# Patient Record
Sex: FEMALE | Race: ASIAN | NOT HISPANIC OR LATINO | URBAN - METROPOLITAN AREA
[De-identification: names, ages, dates, MRNs, and addresses within clinical notes are randomized per-mention and may not be internally consistent; named-entity substitution may affect disease eponyms.]

---

## 2024-08-06 ENCOUNTER — INPATIENT (INPATIENT)
Facility: HOSPITAL | Age: 84
LOS: 2 days | Discharge: LEFT BEFORE DISCHARGE | DRG: 195 | End: 2024-08-09
Attending: INTERNAL MEDICINE | Admitting: INTERNAL MEDICINE
Payer: MEDICARE

## 2024-08-06 VITALS
WEIGHT: 110.01 LBS | DIASTOLIC BLOOD PRESSURE: 79 MMHG | HEIGHT: 60 IN | HEART RATE: 74 BPM | OXYGEN SATURATION: 94 % | TEMPERATURE: 98 F | RESPIRATION RATE: 16 BRPM | SYSTOLIC BLOOD PRESSURE: 134 MMHG

## 2024-08-06 DIAGNOSIS — J18.9 PNEUMONIA, UNSPECIFIED ORGANISM: ICD-10-CM

## 2024-08-06 DIAGNOSIS — Z98.890 OTHER SPECIFIED POSTPROCEDURAL STATES: Chronic | ICD-10-CM

## 2024-08-06 LAB
ALBUMIN SERPL ELPH-MCNC: 2.8 G/DL — LOW (ref 3.3–5)
ALP SERPL-CCNC: 141 U/L — HIGH (ref 40–120)
ALT FLD-CCNC: 74 U/L — HIGH (ref 10–45)
ANION GAP SERPL CALC-SCNC: 14 MMOL/L — SIGNIFICANT CHANGE UP (ref 5–17)
ANISOCYTOSIS BLD QL: SLIGHT — SIGNIFICANT CHANGE UP
AST SERPL-CCNC: 44 U/L — HIGH (ref 10–40)
BASOPHILS # BLD AUTO: 0 K/UL — SIGNIFICANT CHANGE UP (ref 0–0.2)
BASOPHILS NFR BLD AUTO: 0 % — SIGNIFICANT CHANGE UP (ref 0–2)
BILIRUB SERPL-MCNC: 0.4 MG/DL — SIGNIFICANT CHANGE UP (ref 0.2–1.2)
BUN SERPL-MCNC: 20 MG/DL — SIGNIFICANT CHANGE UP (ref 7–23)
CALCIUM SERPL-MCNC: 9.8 MG/DL — SIGNIFICANT CHANGE UP (ref 8.4–10.5)
CHLORIDE SERPL-SCNC: 106 MMOL/L — SIGNIFICANT CHANGE UP (ref 96–108)
CO2 SERPL-SCNC: 19 MMOL/L — LOW (ref 22–31)
CREAT SERPL-MCNC: 1.15 MG/DL — SIGNIFICANT CHANGE UP (ref 0.5–1.3)
DACRYOCYTES BLD QL SMEAR: SLIGHT — SIGNIFICANT CHANGE UP
EGFR: 48 ML/MIN/1.73M2 — LOW
EOSINOPHIL # BLD AUTO: 0.1 K/UL — SIGNIFICANT CHANGE UP (ref 0–0.5)
EOSINOPHIL NFR BLD AUTO: 0.9 % — SIGNIFICANT CHANGE UP (ref 0–6)
GLUCOSE SERPL-MCNC: 97 MG/DL — SIGNIFICANT CHANGE UP (ref 70–99)
HCT VFR BLD CALC: 36.4 % — SIGNIFICANT CHANGE UP (ref 34.5–45)
HGB BLD-MCNC: 11.7 G/DL — SIGNIFICANT CHANGE UP (ref 11.5–15.5)
LYMPHOCYTES # BLD AUTO: 0.39 K/UL — LOW (ref 1–3.3)
LYMPHOCYTES # BLD AUTO: 3.4 % — LOW (ref 13–44)
MANUAL SMEAR VERIFICATION: SIGNIFICANT CHANGE UP
MCHC RBC-ENTMCNC: 29.3 PG — SIGNIFICANT CHANGE UP (ref 27–34)
MCHC RBC-ENTMCNC: 32.1 GM/DL — SIGNIFICANT CHANGE UP (ref 32–36)
MCV RBC AUTO: 91 FL — SIGNIFICANT CHANGE UP (ref 80–100)
MONOCYTES # BLD AUTO: 0.48 K/UL — SIGNIFICANT CHANGE UP (ref 0–0.9)
MONOCYTES NFR BLD AUTO: 4.2 % — SIGNIFICANT CHANGE UP (ref 2–14)
NEUTROPHILS # BLD AUTO: 10.54 K/UL — HIGH (ref 1.8–7.4)
NEUTROPHILS NFR BLD AUTO: 91.5 % — HIGH (ref 43–77)
NRBC # BLD: 3 /100 WBCS — HIGH (ref 0–0)
OVALOCYTES BLD QL SMEAR: SLIGHT — SIGNIFICANT CHANGE UP
PLAT MORPH BLD: NORMAL — SIGNIFICANT CHANGE UP
PLATELET # BLD AUTO: 492 K/UL — HIGH (ref 150–400)
POIKILOCYTOSIS BLD QL AUTO: SLIGHT — SIGNIFICANT CHANGE UP
POTASSIUM SERPL-MCNC: 4.3 MMOL/L — SIGNIFICANT CHANGE UP (ref 3.5–5.3)
POTASSIUM SERPL-SCNC: 4.3 MMOL/L — SIGNIFICANT CHANGE UP (ref 3.5–5.3)
PROT SERPL-MCNC: 7.1 G/DL — SIGNIFICANT CHANGE UP (ref 6–8.3)
RBC # BLD: 4 M/UL — SIGNIFICANT CHANGE UP (ref 3.8–5.2)
RBC # FLD: 13.6 % — SIGNIFICANT CHANGE UP (ref 10.3–14.5)
RBC BLD AUTO: ABNORMAL
SODIUM SERPL-SCNC: 139 MMOL/L — SIGNIFICANT CHANGE UP (ref 135–145)
TROPONIN T, HIGH SENSITIVITY RESULT: 7 NG/L — SIGNIFICANT CHANGE UP (ref 0–51)
WBC # BLD: 11.52 K/UL — HIGH (ref 3.8–10.5)
WBC # FLD AUTO: 11.52 K/UL — HIGH (ref 3.8–10.5)

## 2024-08-06 PROCEDURE — 74177 CT ABD & PELVIS W/CONTRAST: CPT | Mod: 26

## 2024-08-06 PROCEDURE — 99285 EMERGENCY DEPT VISIT HI MDM: CPT

## 2024-08-06 PROCEDURE — 71260 CT THORAX DX C+: CPT | Mod: 26,MC

## 2024-08-06 RX ORDER — VITAMIN E (DL,TOCOPHERYL ACET) 180 MG
1 CAPSULE ORAL
Refills: 0 | DISCHARGE

## 2024-08-06 RX ORDER — VALACYCLOVIR 500 MG/1
1 TABLET, FILM COATED ORAL
Refills: 0 | DISCHARGE

## 2024-08-06 RX ORDER — VALACYCLOVIR 500 MG/1
1000 TABLET, FILM COATED ORAL THREE TIMES A DAY
Refills: 0 | Status: DISCONTINUED | OUTPATIENT
Start: 2024-08-06 | End: 2024-08-09

## 2024-08-06 RX ORDER — HEPARIN SODIUM 1000 [USP'U]/ML
5000 INJECTION, SOLUTION INTRAVENOUS; SUBCUTANEOUS EVERY 8 HOURS
Refills: 0 | Status: DISCONTINUED | OUTPATIENT
Start: 2024-08-06 | End: 2024-08-09

## 2024-08-06 RX ORDER — AMLODIPINE BESYLATE 2.5 MG/1
1 TABLET ORAL
Refills: 0 | DISCHARGE

## 2024-08-06 RX ORDER — BACTERIOSTATIC SODIUM CHLORIDE 0.9 %
1000 VIAL (ML) INJECTION
Refills: 0 | Status: DISCONTINUED | OUTPATIENT
Start: 2024-08-06 | End: 2024-08-08

## 2024-08-06 RX ORDER — AZITHROMYCIN 250 MG
500 TABLET ORAL ONCE
Refills: 0 | Status: COMPLETED | OUTPATIENT
Start: 2024-08-06 | End: 2024-08-06

## 2024-08-06 RX ORDER — LYSINE HCL 500 MG
1 TABLET ORAL
Refills: 0 | DISCHARGE

## 2024-08-06 RX ORDER — AMLODIPINE BESYLATE 2.5 MG/1
5 TABLET ORAL DAILY
Refills: 0 | Status: DISCONTINUED | OUTPATIENT
Start: 2024-08-06 | End: 2024-08-09

## 2024-08-06 RX ORDER — GABAPENTIN 400 MG/1
300 CAPSULE ORAL
Refills: 0 | Status: DISCONTINUED | OUTPATIENT
Start: 2024-08-06 | End: 2024-08-09

## 2024-08-06 RX ORDER — AZITHROMYCIN 250 MG
500 TABLET ORAL EVERY 24 HOURS
Refills: 0 | Status: DISCONTINUED | OUTPATIENT
Start: 2024-08-06 | End: 2024-08-09

## 2024-08-06 RX ORDER — GABAPENTIN 400 MG/1
1 CAPSULE ORAL
Refills: 0 | DISCHARGE

## 2024-08-06 RX ADMIN — Medication 255 MILLIGRAM(S): at 15:45

## 2024-08-06 RX ADMIN — Medication 100 MILLIGRAM(S): at 14:39

## 2024-08-06 RX ADMIN — Medication 75 MILLILITER(S): at 22:34

## 2024-08-06 NOTE — ED PROVIDER NOTE - PROGRESS NOTE DETAILS
TORRES Cavazos PGY2- patient with pneumonia, abx ordered. informed of incidental findings on CT scan. TBA

## 2024-08-06 NOTE — ED ADULT NURSE NOTE - ED STAT RN HANDOFF DETAILS
1500 Report given to receiving change of shift RN. Crystal H  patient is in no acute distress. Patient vital signs stable, plan of care explained. 1500 Report given to receiving change of shift RN. Crystal H  patient is in no acute distress. Patient vital signs stable, plan of care explained, in gold

## 2024-08-06 NOTE — ED ADULT NURSE NOTE - CHPI ED NUR SYMPTOMS POS
I gave her a prescription for Adipex last month for weight loss she was supposed to see me next week and if she take the medication or not and ask patient to see me next week 24th or 26
CHEST PAIN/DIFFICULTY BREATHING/DYSPNEA ON EXERTION/SHORTNESS OF BREATH

## 2024-08-06 NOTE — H&P ADULT - NSHPLABSRESULTS_GEN_ALL_CORE
Nursing Note by Silvia Freeman, RN, BSN at 07/22/18 10:48 AM     Author:  Silvia Freeman, RN, BSN Service:  (none) Author Type:  Registered Nurse     Filed:  07/22/18 10:49 AM Encounter Date:  7/22/2018 Status:  Signed     :  Silvia Freeman RN, BSN (Registered Nurse)            10:15am[PC1.1M]  After name and birthdate were verified, patient was triaged and taken to patient room in stable condition.  Alberto Munroe was offered treatment for pain control:[PC1.1T] Yes.  Patient refused comfort measures to reduce pain.[PC1.1M]    Last visit with AISHWARYA CEBALLOS was on 03/16/2008 at 10:40 AM [facility]  [facility] visit with [facility] was on 07/21/2018 at  9:40 AM [facility]  Match done based on reference date of today 7/22/18[PC1.1T]            Revision History        User Key Date/Time User Provider Type Action    > PC1.1 07/22/18 10:49 AM Silvia Freeman, RN, BSN Registered Nurse Sign    M - Manual, T - Template             LABS:                        11.7   11.52 )-----------( 492      ( 06 Aug 2024 12:57 )             36.4     08-06    139  |  106  |  20  ----------------------------<  97  4.3   |  19<L>  |  1.15    Ca    9.8      06 Aug 2024 12:57    TPro  7.1  /  Alb  2.8<L>  /  TBili  0.4  /  DBili  x   /  AST  44<H>  /  ALT  74<H>  /  AlkPhos  141<H>  08-06      Urinalysis Basic - ( 06 Aug 2024 12:57 )    Color: x / Appearance: x / SG: x / pH: x  Gluc: 97 mg/dL / Ketone: x  / Bili: x / Urobili: x   Blood: x / Protein: x / Nitrite: x   Leuk Esterase: x / RBC: x / WBC x   Sq Epi: x / Non Sq Epi: x / Bacteria: x            RADIOLOGY & ADDITIONAL TESTS:

## 2024-08-06 NOTE — ED PROVIDER NOTE - CLINICAL SUMMARY MEDICAL DECISION MAKING FREE TEXT BOX
This is an 83 year old patient who presents with chest pain and shortness of breath that began last night. She also reported two episodes of hematochezia. Patient declined internal rectal exam. External rectal exam showed a hemorrhoid which is likely the cause of patient's bleeding. Patient's hemoglobin was WNL.     CT Chest ordered to evaluate pleural effusion. Troponin and EKG ordered to evaluate cardiac pathology. This is an 83 year old patient who presents with chest pain and shortness of breath that began last night. She also reported two episodes of hematochezia. Patient declined internal rectal exam. External rectal exam showed a hemorrhoid which is likely the cause of patient's bleeding. Patient's hemoglobin was WNL.     Troponin is WNL and EKG shows ***.  CT Chest  shows multifocal pneumonia and lesions on liver and spleen.   Patient will be admitted for further treatment and management. This is an 83 year old patient who presents with chest pain and shortness of breath that began last night. She also reported two episodes of hematochezia. Patient declined internal rectal exam. External rectal exam showed a hemorrhoid which is likely the cause of patient's bleeding. Patient's hemoglobin was WNL.     Troponin is WNL.  CT Chest  shows multifocal pneumonia and lesions on liver and spleen.   Patient will be admitted for further treatment and management.

## 2024-08-06 NOTE — ED PROVIDER NOTE - OBJECTIVE STATEMENT
The patient is a 83 year old female who presents with shortness of breath and chest pain that began last night. She reports that she went to  where they took an x-ray and found a right sided pleural effusion. She reports that the chest pain began this morning and is epigastric in nature. Additionally she reports that she had two episodes of blood in her stool when she wiped. She denies nausea, vomiting, fever, or chills. She reports she has history of hypertension and is currently being treated for a shingles infection.

## 2024-08-06 NOTE — H&P ADULT - HISTORY OF PRESENT ILLNESS
83 year old female who presents with shortness of breath and chest pain that began last night. She reports that she went to  where they took an x-ray and found a right sided pleural effusion. She reports that the chest pain began this morning and is epigastric in nature. Additionally she reports that she had two episodes of blood in her stool when she wiped. She denies nausea, vomiting, fever, or chills. She reports she has history of hypertension and is currently being treated for a shingles infection.

## 2024-08-06 NOTE — ED ADULT NURSE NOTE - OBJECTIVE STATEMENT
82yo F aaox3 h/o HTN, presents to Ed from home via EMS from , pt is Bulgarian speaking ( agree that her daughter n law translate) pt yesterday sudden onset a L anterior chest pain, and sob, went to : X ray was done; showed r sided pleural effusion, on examinations Pt denies, HA, vision changes, n/v/d, fevers chills, abdominal pain. Safety and comfort measures initiated- bed placed in lowest position and side rails raised. Pt oriented to call bell system. 84yo F aaox3 h/o HTN, presents to Ed from home via EMS from , pt is Icelandic speaking ( agree that her daughter n law translate) pt yesterday sudden onset a L anterior chest pain, and sob, went to : X ray was done; showed r sided pleural effusion, also  c/o blood in the toilet paper x2 episodes today, on examinations Pt denies, HA, vision changes, n/v/d, fevers chills, abdominal pain. Safety and comfort measures initiated- bed placed in lowest position and side rails raised. Pt oriented to call bell system. 84yo F aaox3 h/o HTN, presents to Ed from home via EMS from , pt is English speaking ( agree that her daughter n law translate) pt yesterday sudden onset a L anterior chest pain, and sob, went to : X ray was done; showed r sided pleural effusion, also  c/o blood in the toilet paper x2 episodes today, as per EMS given x4 ASA, on examinations Pt denies, HA, vision changes, n/v/d, fevers chills, abdominal pain. Safety and comfort measures initiated- bed placed in lowest position and side rails raised. Pt oriented to call bell system.

## 2024-08-06 NOTE — ED PROVIDER NOTE - NS ED ROS FT
REVIEW OF SYSTEMS:    CONSTITUTIONAL: No weakness, fevers or chills  EYES/ENT: No visual changes;  No vertigo or throat pain   NECK: No pain or stiffness  RESPIRATORY: No cough, wheezing, hemoptysis; Reports shortness of breath  CARDIOVASCULAR: No chest pain or palpitations  GASTROINTESTINAL: Reports epigastric pain. No nausea, vomiting, or hematemesis; No diarrhea or constipation. Reports hematochezia.  GENITOURINARY: No dysuria, frequency or hematuria  NEUROLOGICAL: No numbness or weakness  SKIN: No itching, rashes  MUSCULOSKELETAL: No joint pain, muscle pain.

## 2024-08-06 NOTE — H&P ADULT - ASSESSMENT
83 year old female who presents with shortness of breath and chest pain that began last night. She reports that she went to  where they took an x-ray and found a right sided pleural effusion. She reports that the chest pain began this morning and is epigastric in nature. Additionally she reports that she had two episodes of blood in her stool when she wiped. She denies nausea, vomiting, fever, or chills. She reports she has history of hypertension and is currently being treated for a shingles infection.    mutifocal PNA  - c/w abx  - follow up cultures  - iv fluids    hypervascular lesions i the liver and spleen with abnl LFTs  - check ct of a/p with coontrast  - follow up LFTs    HTN  - c/w home meds    dvt px  - sq heparin

## 2024-08-06 NOTE — ED PROVIDER NOTE - PHYSICAL EXAMINATION
PHYSICAL EXAM:  GENERAL: NAD, well-groomed, well-developed  HEAD:  Atraumatic, Normocephalic  EYES: EOMI, PERRLA, conjunctiva and sclera clear  ENMT: No tonsillar erythema, exudates, or enlargement; Moist mucous membranes  NECK: Supple, No JVD, Normal thyroid  HEART: Regular rate and rhythm; No murmurs, rubs, or gallops  RESPIRATORY: Crackles in lower right lobe,   ABDOMEN: Soft, Epigastric tenderness, Nondistended;   NEUROLOGY: A&Ox3, nonfocal, moving all extremities  EXTREMITIES:  2+ Peripheral Pulses, No clubbing, cyanosis, or edema  SKIN: warm, dry, normal color, no rash or abnormal lesions PHYSICAL EXAM:  GENERAL: NAD, well-groomed, well-developed  HEAD:  Atraumatic, Normocephalic  EYES: EOMI, PERRLA, conjunctiva and sclera clear  ENMT: No tonsillar erythema, exudates, or enlargement; Moist mucous membranes  NECK: Supple, No JVD, Normal thyroid  HEART: Regular rate and rhythm; No murmurs, rubs, or gallops  RESPIRATORY: Crackles in lower right lobe,   ABDOMEN: Soft, Epigastric tenderness, Nondistended;   GENITOURINARY: External rectal exam showed external hemorrhoid. No don blood   NEUROLOGY: A&Ox3, nonfocal, moving all extremities  EXTREMITIES:  2+ Peripheral Pulses, No clubbing, cyanosis, or edema  SKIN: warm, dry, normal color, no rash or abnormal lesions

## 2024-08-06 NOTE — ED ADULT NURSE REASSESSMENT NOTE - NS ED NURSE REASSESS COMMENT FT1
Patient found in stretcher breathing spontaneously and unlabored on Room Air. No signs of respiratory distress @ this time. Patient is primarily Spanish speaking but allows her daughter in law @ bedside to translate on her behalf. The patient is alert and orientated x4 and responds appropriately. The patient presents with a Left 18G PIV that is C/D/I and infusing Zithromax @ this time. Pt denies palpitations, shortness of breath, headache, visual disturbances, numbness/tingling, fever, chills, diaphoresis,  nausea, vomiting, constipation, diarrhea, or urinary symptoms. Safety and comfort measures provided, bed locked and in lowest position, side rails up for safety. VS to order. Patient requesting shingles medication @ this time, ED MD Cavazos made aware and awaiting new orders at this time.

## 2024-08-06 NOTE — ED ADULT NURSE NOTE - NSFALLHARMRISKINTERV_ED_ALL_ED
Assistance OOB with selected safe patient handling equipment if applicable/Communicate risk of Fall with Harm to all staff, patient, and family/Monitor gait and stability/Provide patient with walking aids/Provide visual cue: red socks, yellow wristband, yellow gown, etc/Reinforce activity limits and safety measures with patient and family/Bed in lowest position, wheels locked, appropriate side rails in place/Call bell, personal items and telephone in reach/Instruct patient to call for assistance before getting out of bed/chair/stretcher/Non-slip footwear applied when patient is off stretcher/[place] to call system/Physically safe environment - no spills, clutter or unnecessary equipment/Purposeful Proactive Rounding/Room/bathroom lighting operational, light cord in reach

## 2024-08-06 NOTE — ED PROVIDER NOTE - ATTENDING CONTRIBUTION TO CARE
84 yo female p/w chest pain, shortness of breath.  reports she was at an urgent care center and diagnosed with an "effusion".  family @ bedside for collateral.      no acute distress on exam.  EKG independently reviewed by me at the bedside, no evidence of STEMI, no tachydysrhythmia.  CBC, CMP sent.  CT chest here concerning for multifocal pneumonia.  plan to give antibiotics and admit for further management.

## 2024-08-07 LAB
ALBUMIN SERPL ELPH-MCNC: 2.5 G/DL — LOW (ref 3.3–5)
ALP SERPL-CCNC: 115 U/L — SIGNIFICANT CHANGE UP (ref 40–120)
ALT FLD-CCNC: 56 U/L — HIGH (ref 10–45)
ANION GAP SERPL CALC-SCNC: 15 MMOL/L — SIGNIFICANT CHANGE UP (ref 5–17)
AST SERPL-CCNC: 40 U/L — SIGNIFICANT CHANGE UP (ref 10–40)
BILIRUB DIRECT SERPL-MCNC: 0.1 MG/DL — SIGNIFICANT CHANGE UP (ref 0–0.3)
BILIRUB INDIRECT FLD-MCNC: 0.2 MG/DL — SIGNIFICANT CHANGE UP (ref 0.2–1)
BILIRUB SERPL-MCNC: 0.3 MG/DL — SIGNIFICANT CHANGE UP (ref 0.2–1.2)
BUN SERPL-MCNC: 14 MG/DL — SIGNIFICANT CHANGE UP (ref 7–23)
CALCIUM SERPL-MCNC: 8.1 MG/DL — LOW (ref 8.4–10.5)
CHLORIDE SERPL-SCNC: 107 MMOL/L — SIGNIFICANT CHANGE UP (ref 96–108)
CO2 SERPL-SCNC: 18 MMOL/L — LOW (ref 22–31)
CREAT SERPL-MCNC: 0.95 MG/DL — SIGNIFICANT CHANGE UP (ref 0.5–1.3)
EGFR: 60 ML/MIN/1.73M2 — SIGNIFICANT CHANGE UP
FOLATE SERPL-MCNC: 6.1 NG/ML — SIGNIFICANT CHANGE UP
GLUCOSE SERPL-MCNC: 102 MG/DL — HIGH (ref 70–99)
HCT VFR BLD CALC: 30.9 % — LOW (ref 34.5–45)
HGB BLD-MCNC: 10.3 G/DL — LOW (ref 11.5–15.5)
MAGNESIUM SERPL-MCNC: 1.5 MG/DL — LOW (ref 1.6–2.6)
MCHC RBC-ENTMCNC: 29.9 PG — SIGNIFICANT CHANGE UP (ref 27–34)
MCHC RBC-ENTMCNC: 33.3 GM/DL — SIGNIFICANT CHANGE UP (ref 32–36)
MCV RBC AUTO: 89.8 FL — SIGNIFICANT CHANGE UP (ref 80–100)
NRBC # BLD: 0 /100 WBCS — SIGNIFICANT CHANGE UP (ref 0–0)
PHOSPHATE SERPL-MCNC: 3.1 MG/DL — SIGNIFICANT CHANGE UP (ref 2.5–4.5)
PLATELET # BLD AUTO: 486 K/UL — HIGH (ref 150–400)
POTASSIUM SERPL-MCNC: 3.9 MMOL/L — SIGNIFICANT CHANGE UP (ref 3.5–5.3)
POTASSIUM SERPL-SCNC: 3.9 MMOL/L — SIGNIFICANT CHANGE UP (ref 3.5–5.3)
PROCALCITONIN SERPL-MCNC: 0.15 NG/ML — HIGH (ref 0.02–0.1)
PROT SERPL-MCNC: 6.3 G/DL — SIGNIFICANT CHANGE UP (ref 6–8.3)
RBC # BLD: 3.44 M/UL — LOW (ref 3.8–5.2)
RBC # FLD: 13.6 % — SIGNIFICANT CHANGE UP (ref 10.3–14.5)
SODIUM SERPL-SCNC: 140 MMOL/L — SIGNIFICANT CHANGE UP (ref 135–145)
TSH SERPL-MCNC: 3.49 UIU/ML — SIGNIFICANT CHANGE UP (ref 0.27–4.2)
VIT B12 SERPL-MCNC: >2000 PG/ML — HIGH (ref 232–1245)
WBC # BLD: 8.84 K/UL — SIGNIFICANT CHANGE UP (ref 3.8–10.5)
WBC # FLD AUTO: 8.84 K/UL — SIGNIFICANT CHANGE UP (ref 3.8–10.5)

## 2024-08-07 RX ORDER — MAGNESIUM SULFATE 500 MG/ML
2 VIAL (ML) INJECTION ONCE
Refills: 0 | Status: COMPLETED | OUTPATIENT
Start: 2024-08-07 | End: 2024-08-07

## 2024-08-07 RX ORDER — NYSTATIN 500K UNIT
500000 TABLET ORAL
Refills: 0 | Status: DISCONTINUED | OUTPATIENT
Start: 2024-08-07 | End: 2024-08-09

## 2024-08-07 RX ORDER — CHLORHEXIDINE GLUCONATE 500 MG/1
1 CLOTH TOPICAL DAILY
Refills: 0 | Status: DISCONTINUED | OUTPATIENT
Start: 2024-08-07 | End: 2024-08-09

## 2024-08-07 RX ORDER — MAGNESIUM, ALUMINUM HYDROXIDE 200-225/5
30 SUSPENSION, ORAL (FINAL DOSE FORM) ORAL ONCE
Refills: 0 | Status: COMPLETED | OUTPATIENT
Start: 2024-08-07 | End: 2024-08-07

## 2024-08-07 RX ADMIN — HEPARIN SODIUM 5000 UNIT(S): 1000 INJECTION, SOLUTION INTRAVENOUS; SUBCUTANEOUS at 22:26

## 2024-08-07 RX ADMIN — GABAPENTIN 300 MILLIGRAM(S): 400 CAPSULE ORAL at 07:42

## 2024-08-07 RX ADMIN — GABAPENTIN 300 MILLIGRAM(S): 400 CAPSULE ORAL at 17:32

## 2024-08-07 RX ADMIN — Medication 500000 UNIT(S): at 17:32

## 2024-08-07 RX ADMIN — Medication 100 MILLIGRAM(S): at 15:45

## 2024-08-07 RX ADMIN — Medication 255 MILLIGRAM(S): at 17:32

## 2024-08-07 RX ADMIN — VALACYCLOVIR 1000 MILLIGRAM(S): 500 TABLET, FILM COATED ORAL at 07:42

## 2024-08-07 RX ADMIN — VALACYCLOVIR 1000 MILLIGRAM(S): 500 TABLET, FILM COATED ORAL at 22:26

## 2024-08-07 RX ADMIN — VALACYCLOVIR 1000 MILLIGRAM(S): 500 TABLET, FILM COATED ORAL at 13:43

## 2024-08-07 RX ADMIN — Medication 25 GRAM(S): at 14:16

## 2024-08-07 RX ADMIN — Medication 75 MILLILITER(S): at 14:16

## 2024-08-07 RX ADMIN — Medication 30 MILLILITER(S): at 18:37

## 2024-08-07 RX ADMIN — AMLODIPINE BESYLATE 5 MILLIGRAM(S): 2.5 TABLET ORAL at 07:42

## 2024-08-07 RX ADMIN — Medication 500000 UNIT(S): at 23:30

## 2024-08-07 NOTE — PHYSICAL THERAPY INITIAL EVALUATION ADULT - PERTINENT HX OF CURRENT PROBLEM, REHAB EVAL
Pt is a 83 year old female who presents with shortness of breath and chest pain that began last night. She reports that she went to  where they took an x-ray and found a right sided pleural effusion. She reports that the chest pain began this morning and is epigastric in nature. Additionally she reports that she had two episodes of blood in her stool when she wiped.   CT Chest: Multifocal pneumonia with trace right pleural effusion. Mediastinal lymphadenopathy. Multiple hypervascular lesions in liver parenchyma and a solitary hyperdense lesion in the spleen, of unknown etiology. Metastasis is a consideration.   CT A/P: Partially imaged chest reveals large consolidation in the right lung concerning for pneumonia.

## 2024-08-07 NOTE — PHYSICAL THERAPY INITIAL EVALUATION ADULT - NSPTDISCHREC_GEN_A_CORE
Pt demonstrates safe and independent functional mobility. pt requires no skilled PT at this time./No skilled PT needs

## 2024-08-07 NOTE — PHYSICAL THERAPY INITIAL EVALUATION ADULT - GENERAL OBSERVATIONS, REHAB EVAL
pt received semi-supine on stretcher in ED, A&0x4, following 100% multi-step commands, +2L 02 NC, +Macedonian speaking; family at bedside translating.

## 2024-08-07 NOTE — PATIENT PROFILE ADULT - VISION (WITH CORRECTIVE LENSES IF THE PATIENT USUALLY WEARS THEM):
Right eye legally blind/Partially impaired: cannot see medication labels or newsprint, but can see obstacles in path, and the surrounding layout; can count fingers at arm's length

## 2024-08-07 NOTE — PHYSICAL THERAPY INITIAL EVALUATION ADULT - ASR WT BEARING STATUS EVAL
Turn to the ER for any worsening symptoms.  Follow-up with your primary care provider on Monday for further management.  If your leg becomes warm hot or increasing pain you may need further evaluation in the emergency department.  May take Tylenol in addition to the Medrol Dosepak and Robaxin.   no weight-bearing restrictions

## 2024-08-07 NOTE — PHYSICAL THERAPY INITIAL EVALUATION ADULT - ACTIVE RANGE OF MOTION EXAMINATION, REHAB EVAL
claribel. upper extremity Active ROM was WNL (within normal limits)/bilateral lower extremity Active ROM was WNL (within normal limits)

## 2024-08-08 LAB
ALBUMIN SERPL ELPH-MCNC: 2.8 G/DL — LOW (ref 3.3–5)
ALP SERPL-CCNC: 120 U/L — SIGNIFICANT CHANGE UP (ref 40–120)
ALT FLD-CCNC: 62 U/L — HIGH (ref 10–45)
ANION GAP SERPL CALC-SCNC: 15 MMOL/L — SIGNIFICANT CHANGE UP (ref 5–17)
AST SERPL-CCNC: 52 U/L — HIGH (ref 10–40)
BILIRUB SERPL-MCNC: 0.4 MG/DL — SIGNIFICANT CHANGE UP (ref 0.2–1.2)
BUN SERPL-MCNC: 11 MG/DL — SIGNIFICANT CHANGE UP (ref 7–23)
C DIFF GDH STL QL: NEGATIVE — SIGNIFICANT CHANGE UP
C DIFF GDH STL QL: SIGNIFICANT CHANGE UP
CALCIUM SERPL-MCNC: 8.9 MG/DL — SIGNIFICANT CHANGE UP (ref 8.4–10.5)
CHLORIDE SERPL-SCNC: 101 MMOL/L — SIGNIFICANT CHANGE UP (ref 96–108)
CO2 SERPL-SCNC: 20 MMOL/L — LOW (ref 22–31)
CREAT SERPL-MCNC: 0.77 MG/DL — SIGNIFICANT CHANGE UP (ref 0.5–1.3)
EGFR: 77 ML/MIN/1.73M2 — SIGNIFICANT CHANGE UP
FERRITIN SERPL-MCNC: 655 NG/ML — HIGH (ref 13–330)
GI PCR PANEL: SIGNIFICANT CHANGE UP
GLUCOSE SERPL-MCNC: 95 MG/DL — SIGNIFICANT CHANGE UP (ref 70–99)
HCT VFR BLD CALC: 34.8 % — SIGNIFICANT CHANGE UP (ref 34.5–45)
HGB BLD-MCNC: 11.6 G/DL — SIGNIFICANT CHANGE UP (ref 11.5–15.5)
IRON SATN MFR SERPL: 26 % — SIGNIFICANT CHANGE UP (ref 14–50)
IRON SATN MFR SERPL: 45 UG/DL — SIGNIFICANT CHANGE UP (ref 30–160)
MAGNESIUM SERPL-MCNC: 2 MG/DL — SIGNIFICANT CHANGE UP (ref 1.6–2.6)
MCHC RBC-ENTMCNC: 30.1 PG — SIGNIFICANT CHANGE UP (ref 27–34)
MCHC RBC-ENTMCNC: 33.3 GM/DL — SIGNIFICANT CHANGE UP (ref 32–36)
MCV RBC AUTO: 90.4 FL — SIGNIFICANT CHANGE UP (ref 80–100)
NRBC # BLD: 0 /100 WBCS — SIGNIFICANT CHANGE UP (ref 0–0)
PHOSPHATE SERPL-MCNC: 2.9 MG/DL — SIGNIFICANT CHANGE UP (ref 2.5–4.5)
PLATELET # BLD AUTO: 563 K/UL — HIGH (ref 150–400)
POTASSIUM SERPL-MCNC: 3.6 MMOL/L — SIGNIFICANT CHANGE UP (ref 3.5–5.3)
POTASSIUM SERPL-SCNC: 3.6 MMOL/L — SIGNIFICANT CHANGE UP (ref 3.5–5.3)
PROT SERPL-MCNC: 6.9 G/DL — SIGNIFICANT CHANGE UP (ref 6–8.3)
RBC # BLD: 3.85 M/UL — SIGNIFICANT CHANGE UP (ref 3.8–5.2)
RBC # FLD: 13.3 % — SIGNIFICANT CHANGE UP (ref 10.3–14.5)
SODIUM SERPL-SCNC: 136 MMOL/L — SIGNIFICANT CHANGE UP (ref 135–145)
TIBC SERPL-MCNC: 173 UG/DL — LOW (ref 220–430)
UIBC SERPL-MCNC: 128 UG/DL — SIGNIFICANT CHANGE UP (ref 110–370)
WBC # BLD: 6.01 K/UL — SIGNIFICANT CHANGE UP (ref 3.8–10.5)
WBC # FLD AUTO: 6.01 K/UL — SIGNIFICANT CHANGE UP (ref 3.8–10.5)

## 2024-08-08 PROCEDURE — 74183 MRI ABD W/O CNTR FLWD CNTR: CPT | Mod: 26

## 2024-08-08 RX ORDER — PROBIOTIC PRODUCT - TAB 1B-250 MG
1 TAB ORAL
Refills: 0 | Status: DISCONTINUED | OUTPATIENT
Start: 2024-08-08 | End: 2024-08-09

## 2024-08-08 RX ORDER — LOPERAMIDE HYDROCHLORIDE 2 MG/1
2 CAPSULE ORAL
Refills: 0 | Status: DISCONTINUED | OUTPATIENT
Start: 2024-08-08 | End: 2024-08-09

## 2024-08-08 RX ORDER — ACETAMINOPHEN 500 MG
325 TABLET ORAL ONCE
Refills: 0 | Status: COMPLETED | OUTPATIENT
Start: 2024-08-08 | End: 2024-08-08

## 2024-08-08 RX ADMIN — Medication 325 MILLIGRAM(S): at 04:55

## 2024-08-08 RX ADMIN — PROBIOTIC PRODUCT - TAB 1 TABLET(S): TAB at 17:03

## 2024-08-08 RX ADMIN — Medication 500000 UNIT(S): at 11:02

## 2024-08-08 RX ADMIN — Medication 500000 UNIT(S): at 17:04

## 2024-08-08 RX ADMIN — GABAPENTIN 300 MILLIGRAM(S): 400 CAPSULE ORAL at 05:28

## 2024-08-08 RX ADMIN — Medication 500000 UNIT(S): at 23:03

## 2024-08-08 RX ADMIN — HEPARIN SODIUM 5000 UNIT(S): 1000 INJECTION, SOLUTION INTRAVENOUS; SUBCUTANEOUS at 05:29

## 2024-08-08 RX ADMIN — Medication 325 MILLIGRAM(S): at 01:50

## 2024-08-08 RX ADMIN — VALACYCLOVIR 1000 MILLIGRAM(S): 500 TABLET, FILM COATED ORAL at 05:28

## 2024-08-08 RX ADMIN — CHLORHEXIDINE GLUCONATE 1 APPLICATION(S): 500 CLOTH TOPICAL at 11:03

## 2024-08-08 RX ADMIN — AMLODIPINE BESYLATE 5 MILLIGRAM(S): 2.5 TABLET ORAL at 05:28

## 2024-08-08 RX ADMIN — LOPERAMIDE HYDROCHLORIDE 2 MILLIGRAM(S): 2 CAPSULE ORAL at 17:04

## 2024-08-08 RX ADMIN — Medication 255 MILLIGRAM(S): at 17:04

## 2024-08-08 RX ADMIN — GABAPENTIN 300 MILLIGRAM(S): 400 CAPSULE ORAL at 17:04

## 2024-08-08 RX ADMIN — Medication 500000 UNIT(S): at 05:28

## 2024-08-08 RX ADMIN — VALACYCLOVIR 1000 MILLIGRAM(S): 500 TABLET, FILM COATED ORAL at 21:12

## 2024-08-08 RX ADMIN — Medication 100 MILLIGRAM(S): at 16:03

## 2024-08-08 RX ADMIN — HEPARIN SODIUM 5000 UNIT(S): 1000 INJECTION, SOLUTION INTRAVENOUS; SUBCUTANEOUS at 21:12

## 2024-08-09 VITALS
DIASTOLIC BLOOD PRESSURE: 71 MMHG | RESPIRATION RATE: 17 BRPM | HEART RATE: 79 BPM | SYSTOLIC BLOOD PRESSURE: 116 MMHG | OXYGEN SATURATION: 95 % | TEMPERATURE: 98 F

## 2024-08-09 LAB
ALBUMIN SERPL ELPH-MCNC: 3.2 G/DL — LOW (ref 3.3–5)
ALP SERPL-CCNC: 118 U/L — SIGNIFICANT CHANGE UP (ref 40–120)
ALT FLD-CCNC: 57 U/L — HIGH (ref 10–45)
ANION GAP SERPL CALC-SCNC: 14 MMOL/L — SIGNIFICANT CHANGE UP (ref 5–17)
AST SERPL-CCNC: 48 U/L — HIGH (ref 10–40)
BILIRUB SERPL-MCNC: 0.4 MG/DL — SIGNIFICANT CHANGE UP (ref 0.2–1.2)
BUN SERPL-MCNC: 12 MG/DL — SIGNIFICANT CHANGE UP (ref 7–23)
CALCIUM SERPL-MCNC: 9.3 MG/DL — SIGNIFICANT CHANGE UP (ref 8.4–10.5)
CHLORIDE SERPL-SCNC: 105 MMOL/L — SIGNIFICANT CHANGE UP (ref 96–108)
CO2 SERPL-SCNC: 21 MMOL/L — LOW (ref 22–31)
CREAT SERPL-MCNC: 0.85 MG/DL — SIGNIFICANT CHANGE UP (ref 0.5–1.3)
EGFR: 69 ML/MIN/1.73M2 — SIGNIFICANT CHANGE UP
GLUCOSE SERPL-MCNC: 93 MG/DL — SIGNIFICANT CHANGE UP (ref 70–99)
HCT VFR BLD CALC: 36.7 % — SIGNIFICANT CHANGE UP (ref 34.5–45)
HGB BLD-MCNC: 12.1 G/DL — SIGNIFICANT CHANGE UP (ref 11.5–15.5)
MAGNESIUM SERPL-MCNC: 2 MG/DL — SIGNIFICANT CHANGE UP (ref 1.6–2.6)
MCHC RBC-ENTMCNC: 30 PG — SIGNIFICANT CHANGE UP (ref 27–34)
MCHC RBC-ENTMCNC: 33 GM/DL — SIGNIFICANT CHANGE UP (ref 32–36)
MCV RBC AUTO: 90.8 FL — SIGNIFICANT CHANGE UP (ref 80–100)
NRBC # BLD: 0 /100 WBCS — SIGNIFICANT CHANGE UP (ref 0–0)
PHOSPHATE SERPL-MCNC: 2.9 MG/DL — SIGNIFICANT CHANGE UP (ref 2.5–4.5)
PLATELET # BLD AUTO: 636 K/UL — HIGH (ref 150–400)
POTASSIUM SERPL-MCNC: 3.9 MMOL/L — SIGNIFICANT CHANGE UP (ref 3.5–5.3)
POTASSIUM SERPL-SCNC: 3.9 MMOL/L — SIGNIFICANT CHANGE UP (ref 3.5–5.3)
PROT SERPL-MCNC: 7.5 G/DL — SIGNIFICANT CHANGE UP (ref 6–8.3)
RBC # BLD: 4.04 M/UL — SIGNIFICANT CHANGE UP (ref 3.8–5.2)
RBC # FLD: 13.2 % — SIGNIFICANT CHANGE UP (ref 10.3–14.5)
SODIUM SERPL-SCNC: 140 MMOL/L — SIGNIFICANT CHANGE UP (ref 135–145)
WBC # BLD: 5.79 K/UL — SIGNIFICANT CHANGE UP (ref 3.8–10.5)
WBC # FLD AUTO: 5.79 K/UL — SIGNIFICANT CHANGE UP (ref 3.8–10.5)

## 2024-08-09 PROCEDURE — 96375 TX/PRO/DX INJ NEW DRUG ADDON: CPT

## 2024-08-09 PROCEDURE — 82728 ASSAY OF FERRITIN: CPT

## 2024-08-09 PROCEDURE — 84100 ASSAY OF PHOSPHORUS: CPT

## 2024-08-09 PROCEDURE — 80053 COMPREHEN METABOLIC PANEL: CPT

## 2024-08-09 PROCEDURE — 82746 ASSAY OF FOLIC ACID SERUM: CPT

## 2024-08-09 PROCEDURE — 84443 ASSAY THYROID STIM HORMONE: CPT

## 2024-08-09 PROCEDURE — 87507 IADNA-DNA/RNA PROBE TQ 12-25: CPT

## 2024-08-09 PROCEDURE — 96374 THER/PROPH/DIAG INJ IV PUSH: CPT

## 2024-08-09 PROCEDURE — 74177 CT ABD & PELVIS W/CONTRAST: CPT | Mod: MC

## 2024-08-09 PROCEDURE — 97161 PT EVAL LOW COMPLEX 20 MIN: CPT

## 2024-08-09 PROCEDURE — 80076 HEPATIC FUNCTION PANEL: CPT

## 2024-08-09 PROCEDURE — 85027 COMPLETE CBC AUTOMATED: CPT

## 2024-08-09 PROCEDURE — 85025 COMPLETE CBC W/AUTO DIFF WBC: CPT

## 2024-08-09 PROCEDURE — 74183 MRI ABD W/O CNTR FLWD CNTR: CPT | Mod: MC

## 2024-08-09 PROCEDURE — 80048 BASIC METABOLIC PNL TOTAL CA: CPT

## 2024-08-09 PROCEDURE — 83735 ASSAY OF MAGNESIUM: CPT

## 2024-08-09 PROCEDURE — 36415 COLL VENOUS BLD VENIPUNCTURE: CPT

## 2024-08-09 PROCEDURE — 87324 CLOSTRIDIUM AG IA: CPT

## 2024-08-09 PROCEDURE — 99285 EMERGENCY DEPT VISIT HI MDM: CPT

## 2024-08-09 PROCEDURE — 87449 NOS EACH ORGANISM AG IA: CPT

## 2024-08-09 PROCEDURE — 71260 CT THORAX DX C+: CPT | Mod: MC

## 2024-08-09 PROCEDURE — 83540 ASSAY OF IRON: CPT

## 2024-08-09 PROCEDURE — 84484 ASSAY OF TROPONIN QUANT: CPT

## 2024-08-09 PROCEDURE — 84145 PROCALCITONIN (PCT): CPT

## 2024-08-09 PROCEDURE — 83550 IRON BINDING TEST: CPT

## 2024-08-09 PROCEDURE — A9585: CPT

## 2024-08-09 PROCEDURE — 82607 VITAMIN B-12: CPT

## 2024-08-09 RX ORDER — PROBIOTIC PRODUCT - TAB 1B-250 MG
1 TAB ORAL
Qty: 15 | Refills: 0
Start: 2024-08-09 | End: 2024-08-13

## 2024-08-09 RX ORDER — NAPROXEN 250 MG
1 TABLET ORAL
Refills: 0 | DISCHARGE

## 2024-08-09 RX ORDER — NYSTATIN 500K UNIT
5 TABLET ORAL
Qty: 80 | Refills: 0
Start: 2024-08-09 | End: 2024-08-12

## 2024-08-09 RX ORDER — NYSTATIN 500K UNIT
5 TABLET ORAL
Qty: 60 | Refills: 0
Start: 2024-08-09 | End: 2024-08-11

## 2024-08-09 RX ORDER — LEVOFLOXACIN 25 MG/ML
1 SOLUTION ORAL
Qty: 5 | Refills: 0
Start: 2024-08-09 | End: 2024-08-13

## 2024-08-09 RX ADMIN — LOPERAMIDE HYDROCHLORIDE 2 MILLIGRAM(S): 2 CAPSULE ORAL at 05:36

## 2024-08-09 RX ADMIN — Medication 500000 UNIT(S): at 05:34

## 2024-08-09 RX ADMIN — HEPARIN SODIUM 5000 UNIT(S): 1000 INJECTION, SOLUTION INTRAVENOUS; SUBCUTANEOUS at 13:28

## 2024-08-09 RX ADMIN — AMLODIPINE BESYLATE 5 MILLIGRAM(S): 2.5 TABLET ORAL at 05:36

## 2024-08-09 RX ADMIN — VALACYCLOVIR 1000 MILLIGRAM(S): 500 TABLET, FILM COATED ORAL at 05:34

## 2024-08-09 RX ADMIN — HEPARIN SODIUM 5000 UNIT(S): 1000 INJECTION, SOLUTION INTRAVENOUS; SUBCUTANEOUS at 05:37

## 2024-08-09 RX ADMIN — VALACYCLOVIR 1000 MILLIGRAM(S): 500 TABLET, FILM COATED ORAL at 13:29

## 2024-08-09 RX ADMIN — Medication 500000 UNIT(S): at 13:28

## 2024-08-09 RX ADMIN — PROBIOTIC PRODUCT - TAB 1 TABLET(S): TAB at 09:59

## 2024-08-09 RX ADMIN — GABAPENTIN 300 MILLIGRAM(S): 400 CAPSULE ORAL at 05:36

## 2024-08-09 NOTE — DISCHARGE NOTE PROVIDER - CARE PROVIDERS DIRECT ADDRESSES
,DirectAddress_Unknown,DirectAddress_Unknown video/audio/written material/group instruction/individual instruction/computer/internet/pictorial/verbal instruction/skill demonstration

## 2024-08-09 NOTE — PROGRESS NOTE ADULT - SUBJECTIVE AND OBJECTIVE BOX
DATE OF SERVICE: 08-09-24 @ 14:05    Patient is a 83y old  Female who presents with a chief complaint of SOB (08 Aug 2024 14:45)      SUBJECTIVE / OVERNIGHT EVENTS:  No chest pain. No shortness of breath. No complaints. No events overnight. no more diarrhea    MEDICATIONS  (STANDING):  amLODIPine   Tablet 5 milliGRAM(s) Oral daily  azithromycin  IVPB 500 milliGRAM(s) IV Intermittent every 24 hours  cefTRIAXone   IVPB 1000 milliGRAM(s) IV Intermittent every 24 hours  chlorhexidine 2% Cloths 1 Application(s) Topical daily  gabapentin 300 milliGRAM(s) Oral two times a day  heparin   Injectable 5000 Unit(s) SubCutaneous every 8 hours  lactobacillus acidophilus 1 Tablet(s) Oral three times a day with meals  loperamide 2 milliGRAM(s) Oral two times a day  nystatin    Suspension 709746 Unit(s) Oral four times a day  valACYclovir 1000 milliGRAM(s) Oral three times a day    MEDICATIONS  (PRN):      Vital Signs Last 24 Hrs  T(C): 36.8 (09 Aug 2024 04:45), Max: 36.8 (08 Aug 2024 21:57)  T(F): 98.2 (09 Aug 2024 04:45), Max: 98.2 (08 Aug 2024 21:57)  HR: 72 (09 Aug 2024 04:45) (72 - 82)  BP: 116/69 (09 Aug 2024 04:45) (116/69 - 118/75)  BP(mean): --  RR: 17 (09 Aug 2024 04:45) (17 - 18)  SpO2: 92% (09 Aug 2024 04:45) (92% - 92%)    Parameters below as of 09 Aug 2024 04:45  Patient On (Oxygen Delivery Method): room air      CAPILLARY BLOOD GLUCOSE        I&O's Summary      PHYSICAL EXAM:  GENERAL: NAD, well-developed  HEAD:  Atraumatic, Normocephalic  EYES: EOMI, PERRLA, conjunctiva and sclera clear  NECK: Supple, No JVD  CHEST/LUNG: Clear to auscultation bilaterally; No wheeze  HEART: Regular rate and rhythm; No murmurs, rubs, or gallops  ABDOMEN: Soft, Nontender, Nondistended; Bowel sounds present  EXTREMITIES:  2+ Peripheral Pulses, No clubbing, cyanosis, or edema  PSYCH: AAOx3  NEUROLOGY: non-focal  SKIN: No rashes or lesions    LABS:                        12.1   5.79  )-----------( 636      ( 09 Aug 2024 07:12 )             36.7     08-09    140  |  105  |  12  ----------------------------<  93  3.9   |  21<L>  |  0.85    Ca    9.3      09 Aug 2024 07:06  Phos  2.9     08-09  Mg     2.0     08-09    TPro  7.5  /  Alb  3.2<L>  /  TBili  0.4  /  DBili  x   /  AST  48<H>  /  ALT  57<H>  /  AlkPhos  118  08-09          Urinalysis Basic - ( 09 Aug 2024 07:06 )    Color: x / Appearance: x / SG: x / pH: x  Gluc: 93 mg/dL / Ketone: x  / Bili: x / Urobili: x   Blood: x / Protein: x / Nitrite: x   Leuk Esterase: x / RBC: x / WBC x   Sq Epi: x / Non Sq Epi: x / Bacteria: x    < from: MR Abdomen w/wo IV Cont (08.08.24 @ 20:27) >  FINDINGS:  LOWER CHEST: Partially visualized right middle and lower lobe   consolidation, unchanged. Small right and trace left pleural effusion.    LIVER: Numerous scattered hepatic T2 mildly hyperintense and T1   isointense lesions with persistent homogenous enhancement, the largest   measuring 1.4 x 1.1 cm in segment 8. These lesions do not demonstrate   restricted diffusion.  BILE DUCTS: Mildly ectatic common bile duct, normal for age. No   choledocholithiasis  GALLBLADDER: Within normal limits.  SPLEEN: Mildly T2 hyperintense and T1 isointense splenic lesion with   persistent homogenous enhancement which measures 1.4 x 1.5 cm. This   lesion does not demonstrate restricted diffusion.  PANCREAS: No pancreatic ductal dilatation No distinct pancreatic lesion   visualized.  ADRENALS: Within normal limits.  KIDNEYS/URETERS: Moderate left hydronephrosis. Bilateral renal cysts. No   enhancing renal lesions.    VISUALIZED PORTIONS:  BOWEL: Normal in caliber Visualized proximal appendix is within normal   limits.  PERITONEUM: No ascites.  VESSELS: Within normal limits.  RETROPERITONEUM/LYMPH NODES: No lymphadenopathy.  ABDOMINAL WALL: Within normal limits.  BONES: L2 vertebral body T2 hyperintense lesion with homogenous   persistent enhancement. Partially visualized lumbar posterior fusion   hardware.    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 Differential   includes gastrointestinal adenocarcinoma.        --- End of Report ---    < end of copied text >      RADIOLOGY & ADDITIONAL TESTS:    Imaging Personally Reviewed:    Consultant(s) Notes Reviewed:      Care Discussed with Consultants/Other Providers:  
DATE OF SERVICE: 08-07-24 @ 12:27    Patient is a 83y old  Female who presents with a chief complaint of SOB (06 Aug 2024 17:56)      SUBJECTIVE / OVERNIGHT EVENTS:  No chest pain. No shortness of breath. No complaints. No events overnight. wants to go home    MEDICATIONS  (STANDING):  amLODIPine   Tablet 5 milliGRAM(s) Oral daily  azithromycin  IVPB 500 milliGRAM(s) IV Intermittent every 24 hours  cefTRIAXone   IVPB 1000 milliGRAM(s) IV Intermittent every 24 hours  gabapentin 300 milliGRAM(s) Oral two times a day  heparin   Injectable 5000 Unit(s) SubCutaneous every 8 hours  sodium chloride 0.9%. 1000 milliLiter(s) (75 mL/Hr) IV Continuous <Continuous>  valACYclovir 1000 milliGRAM(s) Oral three times a day    MEDICATIONS  (PRN):      Vital Signs Last 24 Hrs  T(C): 36.5 (07 Aug 2024 12:23), Max: 36.9 (06 Aug 2024 21:22)  T(F): 97.7 (07 Aug 2024 12:23), Max: 98.4 (06 Aug 2024 21:22)  HR: 87 (07 Aug 2024 12:23) (76 - 92)  BP: 127/78 (07 Aug 2024 12:23) (107/67 - 146/79)  BP(mean): 97 (06 Aug 2024 15:30) (97 - 97)  RR: 18 (07 Aug 2024 12:23) (18 - 19)  SpO2: 92% (07 Aug 2024 12:23) (92% - 98%)    Parameters below as of 07 Aug 2024 12:23  Patient On (Oxygen Delivery Method): nasal cannula  O2 Flow (L/min): 2    CAPILLARY BLOOD GLUCOSE        I&O's Summary      PHYSICAL EXAM:  GENERAL: NAD, well-developed  HEAD:  Atraumatic, Normocephalic  EYES: EOMI, PERRLA, conjunctiva and sclera clear  NECK: Supple, No JVD  CHEST/LUNG: Clear to auscultation bilaterally; No wheeze  HEART: Regular rate and rhythm; No murmurs, rubs, or gallops  ABDOMEN: Soft, Nontender, Nondistended; Bowel sounds present  EXTREMITIES:  2+ Peripheral Pulses, No clubbing, cyanosis, or edema  PSYCH: AAOx3  NEUROLOGY: non-focal  SKIN: No rashes or lesions    LABS:                        10.3   8.84  )-----------( 486      ( 07 Aug 2024 07:03 )             30.9     08-07    140  |  107  |  14  ----------------------------<  102<H>  3.9   |  18<L>  |  0.95    Ca    8.1<L>      07 Aug 2024 07:03  Phos  3.1     08-07  Mg     1.5     08-07    TPro  6.3  /  Alb  2.5<L>  /  TBili  0.3  /  DBili  0.1  /  AST  40  /  ALT  56<H>  /  AlkPhos  115  08-07          Urinalysis Basic - ( 07 Aug 2024 07:03 )    Color: x / Appearance: x / SG: x / pH: x  Gluc: 102 mg/dL / Ketone: x  / Bili: x / Urobili: x   Blood: x / Protein: x / Nitrite: x   Leuk Esterase: x / RBC: x / WBC x   Sq Epi: x / Non Sq Epi: x / Bacteria: x      < from: CT Abdomen and Pelvis w/ IV Cont (08.06.24 @ 20:39) >  PROCEDURE:  CT of the Abdomen and Pelvis was performed.  Sagittal and coronal reformats were performed.    FINDINGS:  LOWER CHEST: Redemonstrated right lung consolidation.    LIVER: Redemonstration multiple hypervascular liver lesions, better seen   on prior chest CT.  BILE DUCTS: Normal caliber.  GALLBLADDER: Contracted.  SPLEEN: 1.4 cm hypervascular lesion.  PANCREAS: Within normal limits.  ADRENALS: Within normal limits.  KIDNEYS/URETERS: No hydronephrosis. Bilateral renal cysts.    BLADDER: Within normal limits.  REPRODUCTIVE ORGANS: 2.0 cm left adnexal cyst.    BOWEL: No bowel obstruction. Appendix is normal.  PERITONEUM/RETROPERITONEUM: Within normal limits.  VESSELS: Atherosclerotic changes.  LYMPH NODES: No lymphadenopathy.  ABDOMINAL WALL: Tiny fat-containing umbilical hernia.  BONES: Degenerative changes. L3-L5 posterior lumbar spinal fusion.    IMPRESSION:  Indeterminant hypervascular liver and splenic lesions. Hypervascular   metastasis cannot be excluded. Recommend contrast-enhanced MRI for   further characterization.    --- End of Report ---    < end of copied text >    RADIOLOGY & ADDITIONAL TESTS:    Imaging Personally Reviewed:    Consultant(s) Notes Reviewed:      Care Discussed with Consultants/Other Providers:  
DATE OF SERVICE: 08-08-24 @ 14:45    Patient is a 83y old  Female who presents with a chief complaint of SOB (07 Aug 2024 12:27)      SUBJECTIVE / OVERNIGHT EVENTS:  has watery diarrhea, wants to go home    MEDICATIONS  (STANDING):  amLODIPine   Tablet 5 milliGRAM(s) Oral daily  azithromycin  IVPB 500 milliGRAM(s) IV Intermittent every 24 hours  cefTRIAXone   IVPB 1000 milliGRAM(s) IV Intermittent every 24 hours  chlorhexidine 2% Cloths 1 Application(s) Topical daily  gabapentin 300 milliGRAM(s) Oral two times a day  heparin   Injectable 5000 Unit(s) SubCutaneous every 8 hours  nystatin    Suspension 112385 Unit(s) Oral four times a day  sodium chloride 0.9%. 1000 milliLiter(s) (75 mL/Hr) IV Continuous <Continuous>  valACYclovir 1000 milliGRAM(s) Oral three times a day    MEDICATIONS  (PRN):      Vital Signs Last 24 Hrs  T(C): 36.6 (08 Aug 2024 13:30), Max: 37.1 (07 Aug 2024 15:50)  T(F): 97.8 (08 Aug 2024 13:30), Max: 98.8 (07 Aug 2024 15:50)  HR: 81 (08 Aug 2024 13:30) (61 - 95)  BP: 125/76 (08 Aug 2024 13:30) (111/85 - 145/82)  BP(mean): --  RR: 18 (08 Aug 2024 13:30) (17 - 18)  SpO2: 93% (08 Aug 2024 13:30) (88% - 95%)    Parameters below as of 08 Aug 2024 13:30  Patient On (Oxygen Delivery Method): nasal cannula  O2 Flow (L/min): 1    CAPILLARY BLOOD GLUCOSE        I&O's Summary      PHYSICAL EXAM:  GENERAL: NAD, well-developed  HEAD:  Atraumatic, Normocephalic  EYES: EOMI, PERRLA, conjunctiva and sclera clear  NECK: Supple, No JVD  CHEST/LUNG: Clear to auscultation bilaterally; No wheeze  HEART: Regular rate and rhythm; No murmurs, rubs, or gallops  ABDOMEN: Soft, Nontender, Nondistended; Bowel sounds present  EXTREMITIES:  2+ Peripheral Pulses, No clubbing, cyanosis, or edema  PSYCH: AAOx3  NEUROLOGY: non-focal  SKIN: No rashes or lesions    LABS:                        11.6   6.01  )-----------( 563      ( 08 Aug 2024 07:09 )             34.8     08-08    136  |  101  |  11  ----------------------------<  95  3.6   |  20<L>  |  0.77    Ca    8.9      08 Aug 2024 07:12  Phos  2.9     08-08  Mg     2.0     08-08    TPro  6.9  /  Alb  2.8<L>  /  TBili  0.4  /  DBili  x   /  AST  52<H>  /  ALT  62<H>  /  AlkPhos  120  08-08          Urinalysis Basic - ( 08 Aug 2024 07:12 )    Color: x / Appearance: x / SG: x / pH: x  Gluc: 95 mg/dL / Ketone: x  / Bili: x / Urobili: x   Blood: x / Protein: x / Nitrite: x   Leuk Esterase: x / RBC: x / WBC x   Sq Epi: x / Non Sq Epi: x / Bacteria: x        RADIOLOGY & ADDITIONAL TESTS:    Imaging Personally Reviewed:    Consultant(s) Notes Reviewed:      Care Discussed with Consultants/Other Providers:

## 2024-08-09 NOTE — DISCHARGE NOTE NURSING/CASE MANAGEMENT/SOCIAL WORK - PATIENT PORTAL LINK FT
You can access the FollowMyHealth Patient Portal offered by [facility] by registering at the following website: [email or web address]. By joining [employer] [employer]’s FollowMyHealth portal, you will also be able to view your health information using other applications (apps) compatible with our system.

## 2024-08-09 NOTE — CONSULT NOTE ADULT - SUBJECTIVE AND OBJECTIVE BOX
[facility]    Kimani Alber Paredes NP    [address] [place] [address] [phone]      Chief Complaint:  Patient is a 83y old  Female who presents with a chief complaint of SOB (09 Aug 2024 14:05)      HPI:83 year old female who presents with shortness of breath and chest pain that began last night. She reports that she went to  where they took an x-ray and found a right sided pleural effusion. She reports that the chest pain began this morning and is epigastric in nature. Additionally she reports that she had two episodes of blood in her stool when she wiped. She denies nausea, vomiting, fever, or chills. She reports she has history of hypertension and is currently being treated for a shingles infection.    Allergies:  No Known Allergies      Medications:  amLODIPine   Tablet 5 milliGRAM(s) Oral daily  azithromycin  IVPB 500 milliGRAM(s) IV Intermittent every 24 hours  cefTRIAXone   IVPB 1000 milliGRAM(s) IV Intermittent every 24 hours  chlorhexidine 2% Cloths 1 Application(s) Topical daily  gabapentin 300 milliGRAM(s) Oral two times a day  heparin   Injectable 5000 Unit(s) SubCutaneous every 8 hours  lactobacillus acidophilus 1 Tablet(s) Oral three times a day with meals  nystatin    Suspension 605857 Unit(s) Oral four times a day  valACYclovir 1000 milliGRAM(s) Oral three times a day      PMHX/PSHX:  Hypertension    History of back surgery        Family history:  No pertinent family history in first degree relatives        Social History:     ROS:     General:  No wt loss, fevers, chills, night sweats, fatigue,   Eyes:  Good vision, no reported pain  ENT:  No sore throat, pain, runny nose, dysphagia  CV:  No pain, palpitations, hypo/hypertension  Resp:  No dyspnea, cough, tachypnea, wheezing  GI:  No pain, No nausea, No vomiting, No diarrhea, No constipation, No weight loss, No fever, No pruritis, No rectal bleeding, No tarry stools, No dysphagia,  :  No pain, bleeding, incontinence, nocturia  Muscle:  No pain, weakness  Neuro:  No weakness, tingling, memory problems  Psych:  No fatigue, insomnia, mood problems, depression  Endocrine:  No polyuria, polydipsia, cold/heat intolerance  Heme:  No petechiae, ecchymosis, easy bruisability  Skin:  No rash, tattoos, scars, edema      PHYSICAL EXAM:   Vital Signs:  Vital Signs Last 24 Hrs  T(C): 36.8 (09 Aug 2024 14:13), Max: 36.8 (08 Aug 2024 21:57)  T(F): 98.2 (09 Aug 2024 14:13), Max: 98.2 (08 Aug 2024 21:57)  HR: 79 (09 Aug 2024 14:13) (72 - 82)  BP: 116/71 (09 Aug 2024 14:13) (116/69 - 118/75)  BP(mean): --  RR: 17 (09 Aug 2024 14:13) (17 - 18)  SpO2: 95% (09 Aug 2024 14:13) (92% - 95%)    Parameters below as of 09 Aug 2024 14:13  Patient On (Oxygen Delivery Method): room air      Daily     Daily     GENERAL:  Appears stated age, well-groomed, well-nourished, no distress  HEENT:  NC/AT,  conjunctivae clear and pink, no thyromegaly, nodules, adenopathy, no JVD, sclera -anicteric  CHEST:  Full & symmetric excursion, no increased effort, breath sounds clear  HEART:  Regular rhythm, S1, S2, no murmur/rub/S3/S4, no abdominal bruit, no edema  ABDOMEN:  Soft, non-tender, non-distended, normoactive bowel sounds,  no masses ,no hepato-splenomegaly, no signs of chronic liver disease  EXTEREMITIES:  no cyanosis,clubbing or edema  SKIN:  No rash/erythema/ecchymoses/petechiae/wounds/abscess/warm/dry  NEURO:  Alert, oriented, no asterixis, no tremor, no encephalopathy    LABS:                        12.1   5.79  )-----------( 636      ( 09 Aug 2024 07:12 )             36.7     08-09    140  |  105  |  12  ----------------------------<  93  3.9   |  21<L>  |  0.85    Ca    9.3      09 Aug 2024 07:06  Phos  2.9     08-09  Mg     2.0     08-09    TPro  7.5  /  Alb  3.2<L>  /  TBili  0.4  /  DBili  x   /  AST  48<H>  /  ALT  57<H>  /  AlkPhos  118  08-09    LIVER FUNCTIONS - ( 09 Aug 2024 07:06 )  Alb: 3.2 g/dL / Pro: 7.5 g/dL / ALK PHOS: 118 U/L / ALT: 57 U/L / AST: 48 U/L / GGT: x             Urinalysis Basic - ( 09 Aug 2024 07:06 )    Color: x / Appearance: x / SG: x / pH: x  Gluc: 93 mg/dL / Ketone: x  / Bili: x / Urobili: x   Blood: x / Protein: x / Nitrite: x   Leuk Esterase: x / RBC: x / WBC x   Sq Epi: x / Non Sq Epi: x / Bacteria: x          Imaging:              
Interventional Radiology    Evaluate for Procedure: Targeted Liver lesion biopsy     HPI: 83 year old Female with CT findings and MRI findings suspicious for malignancy. IR consulted for Targeted Liver lesion biopsy.    Allergies: No Known Allergies    Medications (Abx/Cardiac/Anticoagulation/Blood Products)  amLODIPine   Tablet: 5 milliGRAM(s) Oral (08-09 @ 05:36)  azithromycin  IVPB: 255 mL/Hr IV Intermittent (08-08 @ 17:04)  cefTRIAXone   IVPB: 100 mL/Hr IV Intermittent (08-08 @ 16:03)  heparin   Injectable: 5000 Unit(s) SubCutaneous (08-09 @ 13:28)  nystatin    Suspension: [id number] Unit(s) Oral (08-09 @ 13:28)  valACYclovir: 1000 milliGRAM(s) Oral (08-09 @ 13:29)    Data:  T(C): 36.8  HR: 79  BP: 116/71  RR: 17  SpO2: 95%    -WBC 5.79 / HgB 12.1 / Hct 36.7 / Plt 636  -Na 140 / Cl 105 / BUN 12 / Glucose 93  -K 3.9 / CO2 21 / Cr 0.85  -ALT 57 / Alk Phos 118 / T.Bili 0.4    Radiology: < from: MR Abdomen w/wo IV Cont (08.08.24 @ 20:27) >  PROCEDURE DATE:  08/08/2024      INTERPRETATION:  CLINICAL INFORMATION: Liver and spleen lesions CT    COMPARISON: CT abdomen pelvis 8/6/2024    CONTRAST/COMPLICATIONS:  IV Contrast: Gadavist  6 cc administered   1.5 cc discarded  Oral Contrast: NONE  Complications: None reported at time of study completion    PROCEDURE:  MRI of the abdomen was performed.  MRCP was performed.    FINDINGS:  LOWER CHEST: Partially visualized right middle and lower lobe   consolidation, unchanged. Small right and trace left pleural effusion.    LIVER: Numerous scattered hepatic T2 mildly hyperintense and T1   isointense lesions with persistent homogenous enhancement, the largest   measuring 1.4 x 1.1 cm in segment 8. These lesions do not demonstrate   restricted diffusion.  BILE DUCTS: Mildly ectatic common bile duct, normal for age. No   choledocholithiasis  GALLBLADDER: Within normal limits.  SPLEEN: Mildly T2 hyperintense and T1 isointense splenic lesion with   persistent homogenous enhancement which measures 1.4 x 1.5 cm. This   lesion does not demonstrate restricted diffusion.  PANCREAS: No pancreatic ductal dilatation No distinct pancreatic lesion   visualized.  ADRENALS: Within normal limits.  KIDNEYS/URETERS: Moderate left hydronephrosis. Bilateral renal cysts. No   enhancing renal lesions.    VISUALIZED PORTIONS:  BOWEL: Normal in caliber Visualized proximal appendix is within normal   limits.  PERITONEUM: No ascites.  VESSELS: Within normal limits.  RETROPERITONEUM/LYMPH NODES: No lymphadenopathy.  ABDOMINAL WALL: Within normal limits.  BONES: L2 vertebral body T2 hyperintense lesion with homogenous   persistent enhancement. Partially visualized lumbar posterior fusion   hardware.    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 Differential   includes gastrointestinal adenocarcinoma.      < from: CT Abdomen and Pelvis w/ IV Cont (08.06.24 @ 20:39) >  PROCEDURE DATE:  08/06/2024        INTERPRETATION:  CLINICAL INFORMATION: Hypervascular liver and spleen   lesions on chest CT. Evaluate malignancy.    COMPARISON:CT chest 8/6/2024    CONTRAST/COMPLICATIONS:  IV Contrast: Omnipaque 350  90 cc administered   10 cc discarded  Oral Contrast: NONE  Complications: None reported at time of study completion    PROCEDURE:  CT of the Abdomen and Pelvis was performed.  Sagittal and coronal reformats were performed.    FINDINGS:  LOWER CHEST: Redemonstrated right lung consolidation.    LIVER: Redemonstration multiple hypervascular liver lesions, better seen   on prior chest CT.  BILE DUCTS: Normal caliber.  GALLBLADDER: Contracted.  SPLEEN: 1.4 cm hypervascular lesion.  PANCREAS: Within normal limits.  ADRENALS: Within normal limits.  KIDNEYS/URETERS: No hydronephrosis. Bilateral renal cysts.    BLADDER: Within normal limits.  REPRODUCTIVE ORGANS: 2.0 cm left adnexal cyst.    BOWEL: No bowel obstruction. Appendix is normal.  PERITONEUM/RETROPERITONEUM: Within normal limits.  VESSELS: Atherosclerotic changes.  LYMPH NODES: No lymphadenopathy.  ABDOMINAL WALL: Tiny fat-containing umbilical hernia.  BONES: Degenerative changes. L3-L5 posterior lumbar spinal fusion.    IMPRESSION:  Indeterminant hypervascular liver and splenic lesions. Hypervascular   metastasis cannot be excluded. Recommend contrast-enhanced MRI for   further characterization.            Assessment/Plan: 83 year old Female with CT findings and MRI findings suspicious for malignancy. IR consulted for Targeted Liver lesion biopsy.    - Imaging reviewed with attending Dr. Juarez  - Lesion on liver is amendable for biopsy  - Per Dr. Garcia, patient family does not want to pursue biopsy as inpatient  - Can plan for outpatient biopsy in IR  - IR booking office [phone] for outpatient booking for Targeted Liver lesion biopsy.

## 2024-08-09 NOTE — DISCHARGE NOTE NURSING/CASE MANAGEMENT/SOCIAL WORK - NSDCFUADDAPPT_GEN_ALL_CORE_FT
You must follow up with your primary medical doctor within one day of discharge - please call to make an appointment.    You must follow up with your gastroenterologist within 1 day of discharge - please call to make an appointment.    You must follow up with an Interventional Radiologist to schedule a liver biopsy - you can call [phone] to make an appointment at [facility] Interventional Radiology.        APPTS ARE READY TO BE MADE: [X ] YES    Best Family or Patient Contact (if needed):    Additional Information about above appointments (if needed):    1: Primary medical doctor  2: Gastroenterologist  3: Interventional Radiologist    Other comments or requests:

## 2024-08-09 NOTE — DISCHARGE NOTE PROVIDER - CARE PROVIDER_API CALL
Kedar Topete  Internal Medicine  [address], [address]  [place] [address]  Phone: [phone]  Fax: [phone]  Follow Up Time:     Coleman Kidd  Interventional Radiology and Diagnostic Radiology  [address] [place] [id number]  Phone: [phone]  Fax: [phone]  Follow Up Time: 1-3 days   -Bathe with a washcloth until cord falls off and if a boy until circumcision heals.

## 2024-08-09 NOTE — DISCHARGE NOTE PROVIDER - NSDCMRMEDTOKEN_GEN_ALL_CORE_FT
amLODIPine 5 mg oral tablet: 1 tab(s) orally once a day  ascorbic acid 500 mg oral tablet: 1 tab(s) orally once a day  gabapentin 300 mg oral capsule: 1 cap(s) orally 2 times a day  lactobacillus acidophilus oral capsule: 1 cap(s) orally 3 times a day (with meals)  levoFLOXacin 500 mg oral tablet: 1 tab(s) orally once a day  nystatin 100,000 units/mL oral suspension: 5 milliliter(s) orally 4 times a day Through 8/13/2024 and then discontinue  valACYclovir 1 g oral tablet: 1 tab(s) orally 3 times a day  vitamin E d-alpha 400 intl units oral capsule: 1 cap(s) orally once a day

## 2024-08-09 NOTE — DISCHARGE NOTE PROVIDER - NSDCFUADDAPPT_GEN_ALL_CORE_FT
You must follow up with your primary medical doctor within one day of discharge - please call to make an appointment.    You must follow up with your gastroenterologist within 1 day of discharge - please call to make an appointment.    You must follow up with an Interventional Radiologist to schedule a liver biopsy - you can call [phone] to make an appointment at [facility] Interventional Radiology.        APPTS ARE READY TO BE MADE: [X ] YES    Best Family or Patient Contact (if needed):    Additional Information about above appointments (if needed):    1: Primary medical doctor  2: Gastroenterologist  3: Interventional Radiologist    Other comments or requests:    You must follow up with your primary medical doctor within one day of discharge - please call to make an appointment.    You must follow up with your gastroenterologist within 1 day of discharge - please call to make an appointment.    You must follow up with an Interventional Radiologist to schedule a liver biopsy - you can call [phone] to make an appointment at [facility] Interventional Radiology.        APPTS ARE READY TO BE MADE: [X ] YES    Best Family or Patient Contact (if needed):    Additional Information about above appointments (if needed):    1: Primary medical doctor  2: Gastroenterologist  3: Interventional Radiologist    Other comments or requests:   Patient informed us they already have secured a follow up appointment which is not visible on [facility].

## 2024-08-09 NOTE — CHART NOTE - NSCHARTNOTEFT_GEN_A_CORE
Patient is leaving against medical advice.  After thorough discussion regarding the risks, using the  service, the patient (and the patient's daughter in law) verbalized understanding that leaving now without completing the recommended diagnostic and treatment regimen may result in permanent physical injury up to and including death.  Dr. Topete made aware that patient is leaving against medical advice.  Medication reconciliation reviewed, revised, and resolved with Dr. Topete, who has medically cleared patient for discharge with follow up as advised.  Please refer to discharge note for detailed hospital course.

## 2024-08-09 NOTE — DISCHARGE NOTE PROVIDER - HOSPITAL COURSE
83 year old female who presents with shortness of breath and chest pain that began last night. She reports that she went to  where they took an x-ray and found a right sided pleural effusion. She reports that the chest pain began this morning and is epigastric in nature. Additionally she reports that she had two episodes of blood in her stool when she wiped. She denies nausea, vomiting, fever, or chills. She reports she has history of hypertension and is currently being treated for a shingles infection.    mutifocal PNA  - treated with Azithromycin and Ceftriaxone  - d/c  iv fluids  - on discharge can change to po Levaquin for 5 more days    diarrhea  - GI PCR and C DIFF negative  - imodium bid prn  - bacid tid    hypoxic resp failure  - needed supplemental O2  - weaned off    hypervascular lesions i the liver and spleen with abnl LFTs  - check ct of a/p with contrast - done  - MRI done - showing liver lesions  - monitored LFTs  - gi eval - recommending IR consult for liver biopsy, but patient insists to leave AMA    HTN  - c/w home meds    Patient requesting to leave AMA.  Dr. Topete aware.  Patient educated to follow up with PCP, GI, and Interventional Radiology immediately.

## 2024-08-09 NOTE — DISCHARGE NOTE PROVIDER - NSDCCPCAREPLAN_GEN_ALL_CORE_FT
PRINCIPAL DISCHARGE DIAGNOSIS  Diagnosis: Pneumonia  Assessment and Plan of Treatment: You are being discharged on Levaquin, which you will take for 5 days and then discontinue.  Pneumonia is a lung infection that can cause a fever, cough, and trouble breathing.  Continue all antibiotics as ordered until complete.  Nutrition is important, eat small frequent meals.  Get lots of rest and drink fluids.  Call your health care provider upon arrival home from hospital and make a follow up appointment for one week.  If your cough worsens, you develop fever greater than 101', you have shaking chills, a fast heartbeat, trouble breathing and/or feel your are breathing much faster than usual, call your healthcare provider.  Make sure you wash your hands frequently.        SECONDARY DISCHARGE DIAGNOSES  Diagnosis: Liver lesion  Assessment and Plan of Treatment: Your CT/MRI were shown to have liver/splenic lesions.  You were recommended to have a liver biopsy with interventional radiology during this admission.  However, you are leaving AMA.  You MUST follow up with your primary medical doctor, gastroenterologist, and an Interventional radiologist, as soon as possible, for further evaluation of these lesions.    Diagnosis: HTN (hypertension)  Assessment and Plan of Treatment: Low salt diet  Activity as tolerated.  Take all medication as prescribed.  Follow up with your medical doctor for routine blood pressure monitoring at your next visit.  Notify your doctor if you have any of the following symptoms:   Dizziness, Lightheadedness, Blurry vision, Headache, Chest pain, Shortness of breath      Diagnosis: Diarrhea  Assessment and Plan of Treatment: Resolved  Follow up with your gastroenterologist

## 2024-08-09 NOTE — CONSULT NOTE ADULT - ASSESSMENT
liver mass  rectal bleed    CT findings and MRI findings reviewed and d/w son and dtr in law over phone  this is metastatic malignancy until proven otherwise  i recommended continued admission for IR liver biopsy  family insists that patient should be discharged since her pneumonia has been treated and will follow up with her regular medical doctors in [place]  i encouraged her to take all the records with her if she does want to leave [place]

## 2024-08-09 NOTE — PROGRESS NOTE ADULT - ASSESSMENT
83 year old female who presents with shortness of breath and chest pain that began last night. She reports that she went to  where they took an x-ray and found a right sided pleural effusion. She reports that the chest pain began this morning and is epigastric in nature. Additionally she reports that she had two episodes of blood in her stool when she wiped. She denies nausea, vomiting, fever, or chills. She reports she has history of hypertension and is currently being treated for a shingles infection.    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
83 year old female who presents with shortness of breath and chest pain that began last night. She reports that she went to  where they took an x-ray and found a right sided pleural effusion. She reports that the chest pain began this morning and is epigastric in nature. Additionally she reports that she had two episodes of blood in her stool when she wiped. She denies nausea, vomiting, fever, or chills. She reports she has history of hypertension and is currently being treated for a shingles infection.    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   I explained findings on the MRI and need for liver biopsy.  pt and family have decided to sign out AMA and pursue out pt workup.  i explained possibility of metastatic disease and risk of progression.  they verbalized understandig
83 year old female who presents with shortness of breath and chest pain that began last night. She reports that she went to  where they took an x-ray and found a right sided pleural effusion. She reports that the chest pain began this morning and is epigastric in nature. Additionally she reports that she had two episodes of blood in her stool when she wiped. She denies nausea, vomiting, fever, or chills. She reports she has history of hypertension and is currently being treated for a shingles infection.    mutifocal PNA  - c/w abx  - follow up cultures  - d/c  iv fluids    diarrhea  - GI PCR and C DIFF negative  - imodium bid  - bacid tid    hypoxic resp failure  - needs supplemental O2  - wean as tolerated    hypervascular lesions i the liver and spleen with abnl LFTs  - check ct of a/p with contrast - done  - MRI recommended  - follow up LFTs    HTN  - c/w home meds    dvt px  - sq heparin

## 2024-08-12 NOTE — POST DISCHARGE NOTE - NOTIFICATION:
Patient's MRI scan reflected on the Radiology Incidental Findings report for today. L/M for patient to provide outpatient assistance. Will continue to follow up.
